# Patient Record
Sex: FEMALE | Race: BLACK OR AFRICAN AMERICAN | Employment: UNEMPLOYED | URBAN - METROPOLITAN AREA
[De-identification: names, ages, dates, MRNs, and addresses within clinical notes are randomized per-mention and may not be internally consistent; named-entity substitution may affect disease eponyms.]

---
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
## 2025-01-31 ENCOUNTER — OFFICE VISIT (OUTPATIENT)
Age: 20
End: 2025-01-31
Payer: MEDICAID

## 2025-01-31 VITALS
TEMPERATURE: 97.8 F | HEART RATE: 79 BPM | RESPIRATION RATE: 15 BRPM | DIASTOLIC BLOOD PRESSURE: 91 MMHG | SYSTOLIC BLOOD PRESSURE: 136 MMHG | HEIGHT: 63 IN | OXYGEN SATURATION: 96 % | WEIGHT: 151 LBS | BODY MASS INDEX: 26.75 KG/M2

## 2025-01-31 DIAGNOSIS — N92.6 MISSED MENSES: Primary | ICD-10-CM

## 2025-01-31 PROCEDURE — 99202 OFFICE O/P NEW SF 15 MIN: CPT

## 2025-01-31 SDOH — ECONOMIC STABILITY: FOOD INSECURITY: WITHIN THE PAST 12 MONTHS, YOU WORRIED THAT YOUR FOOD WOULD RUN OUT BEFORE YOU GOT MONEY TO BUY MORE.: NEVER TRUE

## 2025-01-31 SDOH — ECONOMIC STABILITY: FOOD INSECURITY: WITHIN THE PAST 12 MONTHS, THE FOOD YOU BOUGHT JUST DIDN'T LAST AND YOU DIDN'T HAVE MONEY TO GET MORE.: NEVER TRUE

## 2025-01-31 ASSESSMENT — PATIENT HEALTH QUESTIONNAIRE - PHQ9
2. FEELING DOWN, DEPRESSED OR HOPELESS: NOT AT ALL
SUM OF ALL RESPONSES TO PHQ QUESTIONS 1-9: 0
SUM OF ALL RESPONSES TO PHQ9 QUESTIONS 1 & 2: 0
SUM OF ALL RESPONSES TO PHQ QUESTIONS 1-9: 0
SUM OF ALL RESPONSES TO PHQ QUESTIONS 1-9: 0
1. LITTLE INTEREST OR PLEASURE IN DOING THINGS: NOT AT ALL
SUM OF ALL RESPONSES TO PHQ QUESTIONS 1-9: 0

## 2025-01-31 NOTE — PROGRESS NOTES
Claudia Hope is a 19 y.o. female returns for an annual exam     Chief Complaint   Patient presents with    Annual Exam       Patient's last menstrual period was 09/28/2024 (approximate).  Her periods are irregular.    Problems: Patient states she has not had a menstrual cycle since the \"end of September\".  [facility] and [facility] told her to come to see a gynecologist.  Pregnancy test negative per patient.  Birth Control: none.  Last Pap: n/a  With regard to the Gardisil vaccine, she has not received it yet      1. Have you been to the ER, urgent care clinic, or hospitalized since your last visit?     [facility] due [facility]    2. Have you seen or consulted any other health care providers outside of the [facility] since your last visit? No    Examination chaperoned by Lupe Meza RN.

## 2025-02-01 LAB
ESTRADIOL SERPL-MCNC: 47.5 PG/ML
FSH SERPL-ACNC: 5.1 MIU/ML
HCG INTACT+B SERPL-ACNC: <1 MIU/ML
PROLACTIN SERPL-MCNC: 16.9 NG/ML (ref 4.8–33.4)
TSH SERPL DL<=0.005 MIU/L-ACNC: 1.56 UIU/ML (ref 0.45–4.5)

## 2025-02-01 NOTE — PROGRESS NOTES
Claudia Hope is a 19 y.o. female who complains of amenorrhea since September. Her periods have been irregular in the past but she notes this is an extremely long amount of time to go without having one. She is not having any symptoms. She is not taking any birth control. Of note, she has known chronic htn, managed by other provider.     Her relevant past medical history:   No past medical history on file.     Past Surgical History:   Procedure Laterality Date    TONSILLECTOMY AND ADENOIDECTOMY       Social History     Occupational History    Not on file   Tobacco Use    Smoking status: Never    Smokeless tobacco: Never   Substance and Sexual Activity    Alcohol use: Never    Drug use: Never    Sexual activity: Not on file     No family history on file.    No Known Allergies  Prior to Admission medications    Medication Sig Start Date End Date Taking? Authorizing Provider   ondansetron (ZOFRAN-ODT) 4 MG disintegrating tablet Take 1 tablet by mouth 3 times daily as needed for Nausea or Vomiting  Patient not taking: Reported on 1/31/2025 11/22/23   Mal Rowe MD   famotidine (PEPCID) 20 MG tablet Take 1 tablet by mouth 2 times daily  Patient not taking: Reported on 1/31/2025 11/22/23   Levi Villaseñor MD   ibuprofen (ADVIL;MOTRIN) 600 MG tablet Take 1 tablet by mouth every 6 hours as needed for Pain 5/16/23 5/23/23  Levi Villaseñor MD   fluticasone (FLONASE) 50 MCG/ACT nasal spray 1 spray by Each Nostril route daily  Patient not taking: Reported on 1/31/2025 5/16/23   Levi Villaseñor MD        Review of Systems - History obtained from the patient  Constitutional: negative for weight loss, fever, night sweats  HEENT: negative for hearing loss, earache, congestion, snoring, sorethroat  CV: negative for chest pain, palpitations, edema  Resp: negative for cough, shortness of breath, wheezing  Breast: negative for breast lumps, nipple discharge, galactorrhea  GI: negative for change in bowel habits, abdominal

## 2025-02-04 ENCOUNTER — TELEPHONE (OUTPATIENT)
Age: 20
End: 2025-02-04

## 2025-02-04 NOTE — TELEPHONE ENCOUNTER
Patient returned our call to the office to go over her most recent labs. Message from the provider has been relayed and pt has expressed understanding.     She reports that she does not want to try any birth control as she is currently TTC, which is why she is worried about not having her period.     She wanted to pick your brain to find out if you had any recommendations or tips on conceiving.

## 2025-03-03 ENCOUNTER — TELEPHONE (OUTPATIENT)
Age: 20
End: 2025-03-03

## 2025-03-04 ENCOUNTER — TELEPHONE (OUTPATIENT)
Age: 20
End: 2025-03-04

## 2025-03-10 ENCOUNTER — TELEPHONE (OUTPATIENT)
Age: 20
End: 2025-03-10

## 2025-03-11 ENCOUNTER — OFFICE VISIT (OUTPATIENT)
Age: 20
End: 2025-03-11
Payer: MEDICAID

## 2025-03-11 VITALS
WEIGHT: 161 LBS | HEIGHT: 63 IN | HEART RATE: 91 BPM | DIASTOLIC BLOOD PRESSURE: 84 MMHG | SYSTOLIC BLOOD PRESSURE: 126 MMHG | TEMPERATURE: 98.2 F | OXYGEN SATURATION: 97 % | RESPIRATION RATE: 16 BRPM | BODY MASS INDEX: 28.53 KG/M2

## 2025-03-11 DIAGNOSIS — N92.6 IRREGULAR PERIODS/MENSTRUAL CYCLES: Primary | ICD-10-CM

## 2025-03-11 DIAGNOSIS — N92.6 IRREGULAR PERIODS/MENSTRUAL CYCLES: ICD-10-CM

## 2025-03-11 PROCEDURE — 99213 OFFICE O/P EST LOW 20 MIN: CPT | Performed by: ADVANCED PRACTICE MIDWIFE

## 2025-03-11 NOTE — PROGRESS NOTES
Chief Complaint   Patient presents with    Irregular Menses     PCOS concerns; pt not wanting to be put on birth control        /84   Pulse 91   Temp 98.2 °F (36.8 °C) (Oral)   Resp 16   Ht 1.6 m (5' 3\")   Wt 73 kg (161 lb)   LMP 03/08/2025   SpO2 97%   BMI 28.52 kg/m²      1. Have you been to the ER, urgent care clinic since your last visit?  Hospitalized since your last visit?No    2. Have you seen or consulted any other health care providers outside of the [facility] since your last visit?  Include any pap smears or colon screening. No

## 2025-03-11 NOTE — PROGRESS NOTES
Claudia Hope is a 19 y.o. female who complains of irregular cycles, she started her cycle 3 days ago, prior to this had not had a cycles since October. She saw Suleman Parra the end of Jan 2025 and had some labs done, these were normal however were done while she was amenorrheic. I will order some labs today as she is on day 3 of her cycle.     Claudia as been watching TicTok and researching PCOS on [email or web address]. She is worried she has this, we will do labs so we can discuss the results and any treatment plans.     She is unsure if she wants to conceive at this time. She is fairly sure she does not want to be on birth control.     Her relevant past medical history:   History reviewed. No pertinent past medical history.     Past Surgical History:   Procedure Laterality Date    TONSILLECTOMY AND ADENOIDECTOMY       Social History     Occupational History    Not on file   Tobacco Use    Smoking status: Never    Smokeless tobacco: Never   Substance and Sexual Activity    Alcohol use: Never    Drug use: Never    Sexual activity: Not on file     History reviewed. No pertinent family history.    No Known Allergies  Prior to Admission medications    Medication Sig Start Date End Date Taking? Authorizing Provider   ondansetron (ZOFRAN-ODT) 4 MG disintegrating tablet Take 1 tablet by mouth 3 times daily as needed for Nausea or Vomiting  Patient not taking: Reported on 1/31/2025 11/22/23   Mal Rowe MD   famotidine (PEPCID) 20 MG tablet Take 1 tablet by mouth 2 times daily  Patient not taking: Reported on 1/31/2025 11/22/23   Levi Villaseñor MD   ibuprofen (ADVIL;MOTRIN) 600 MG tablet Take 1 tablet by mouth every 6 hours as needed for Pain 5/16/23 5/23/23  Levi Villaseñor MD   fluticasone (FLONASE) 50 MCG/ACT nasal spray 1 spray by Each Nostril route daily  Patient not taking: Reported on 1/31/2025 5/16/23   Levi Villaseñor MD        Review of Systems - History obtained from the patient  Constitutional: negative for

## 2025-03-12 ENCOUNTER — PATIENT MESSAGE (OUTPATIENT)
Age: 20
End: 2025-03-12

## 2025-03-12 LAB — HBA1C MFR BLD: 5.5 % (ref 4.8–5.6)

## 2025-03-13 LAB
DHEA-S SERPL-MCNC: 322 UG/DL (ref 110–433.2)
ESTRADIOL SERPL-MCNC: 41.3 PG/ML
FSH SERPL-ACNC: 5.2 MIU/ML
LH SERPL-ACNC: 16.5 MIU/ML
PROLACTIN SERPL-MCNC: 14.1 NG/ML (ref 4.8–33.4)

## 2025-03-14 LAB — 17OHP SERPL-MCNC: 106 NG/DL

## 2025-03-16 LAB — MIS SERPL-MCNC: 7.21 NG/ML

## 2025-03-17 ENCOUNTER — TELEPHONE (OUTPATIENT)
Age: 20
End: 2025-03-17

## 2025-03-17 NOTE — TELEPHONE ENCOUNTER
Patient called in, name and  verified. Patient is calling as she had recently had some blood work done that [facility] ordered. She has been able to view them online for a while but has not had any guidance on the results as of yet.    Could we review these and let her know next steps?    She states we were looking into possible PCOS with her abnormal periods. She also reports she is TTC so she is anxious. Please let me know if there are any messages you would like relayed to the pt.    Triage notes: [phone]

## 2025-03-18 ENCOUNTER — RESULTS FOLLOW-UP (OUTPATIENT)
Age: 20
End: 2025-03-18

## 2025-03-18 LAB
TESTOST FREE SERPL-MCNC: 2.6 PG/ML
TESTOST SERPL-MCNC: 63 NG/DL (ref 13–71)

## 2025-03-31 ENCOUNTER — OFFICE VISIT (OUTPATIENT)
Age: 20
End: 2025-03-31
Payer: MEDICAID

## 2025-03-31 VITALS
HEART RATE: 102 BPM | WEIGHT: 166 LBS | HEIGHT: 63 IN | DIASTOLIC BLOOD PRESSURE: 83 MMHG | SYSTOLIC BLOOD PRESSURE: 117 MMHG | RESPIRATION RATE: 16 BRPM | OXYGEN SATURATION: 97 % | TEMPERATURE: 98.6 F | BODY MASS INDEX: 29.41 KG/M2

## 2025-03-31 DIAGNOSIS — Z32.02 PREGNANCY TEST NEGATIVE: Primary | ICD-10-CM

## 2025-03-31 DIAGNOSIS — Z31.69 ENCOUNTER FOR PRECONCEPTION CONSULTATION: ICD-10-CM

## 2025-03-31 LAB
HCG, PREGNANCY, URINE, POC: NEGATIVE
VALID INTERNAL CONTROL, POC: YES

## 2025-03-31 PROCEDURE — 81025 URINE PREGNANCY TEST: CPT

## 2025-03-31 PROCEDURE — 99212 OFFICE O/P EST SF 10 MIN: CPT

## 2025-03-31 NOTE — PROGRESS NOTES
Claudia Hope is a 19 y.o. female who presents to discuss conception planning. Pt has been seen for this previously but is here today for an official visit. Previously had lab work done in our office that indicates increased LH:FSH. She also has irregular cycles. She has been having unprotected sex for 8 months and trying to use an erin to track.     Her relevant past medical history:   History reviewed. No pertinent past medical history.     Past Surgical History:   Procedure Laterality Date    TONSILLECTOMY AND ADENOIDECTOMY       Social History     Occupational History    Not on file   Tobacco Use    Smoking status: Never    Smokeless tobacco: Never   Substance and Sexual Activity    Alcohol use: Never    Drug use: Never    Sexual activity: Not on file     History reviewed. No pertinent family history.    No Known Allergies  Prior to Admission medications    Medication Sig Start Date End Date Taking? Authorizing Provider   ondansetron (ZOFRAN-ODT) 4 MG disintegrating tablet Take 1 tablet by mouth 3 times daily as needed for Nausea or Vomiting  Patient not taking: Reported on 3/31/2025 11/22/23   Mal Rowe MD   famotidine (PEPCID) 20 MG tablet Take 1 tablet by mouth 2 times daily  Patient not taking: Reported on 3/31/2025 11/22/23   Levi Villaseñor MD   ibuprofen (ADVIL;MOTRIN) 600 MG tablet Take 1 tablet by mouth every 6 hours as needed for Pain 5/16/23 5/23/23  Levi Villaseñor MD   fluticasone (FLONASE) 50 MCG/ACT nasal spray 1 spray by Each Nostril route daily  Patient not taking: Reported on 3/31/2025 5/16/23   Levi Villaseñor MD        Review of Systems - History obtained from the patient  Constitutional: negative for weight loss, fever, night sweats  HEENT: negative for hearing loss, earache, congestion, snoring, sorethroat  CV: negative for chest pain, palpitations, edema  Resp: negative for cough, shortness of breath, wheezing  Breast: negative for breast lumps, nipple discharge,

## 2025-03-31 NOTE — PROGRESS NOTES
Chief Complaint   Patient presents with    Results    Follow-up        /83   Pulse (!) 102   Temp 98.6 °F (37 °C) (Oral)   Resp 16   Ht 1.6 m (5' 3\")   Wt 75.3 kg (166 lb)   LMP 03/08/2025   SpO2 97%   BMI 29.41 kg/m²      1. Have you been to the ER, urgent care clinic since your last visit?  Hospitalized since your last visit?No    2. Have you seen or consulted any other health care providers outside of the [facility] since your last visit?  Include any pap smears or colon screening. No

## 2025-04-10 ENCOUNTER — TELEPHONE (OUTPATIENT)
Age: 20
End: 2025-04-10

## 2025-06-10 ENCOUNTER — TELEPHONE (OUTPATIENT)
Age: 20
End: 2025-06-10

## 2025-06-10 ENCOUNTER — TELEMEDICINE (OUTPATIENT)
Age: 20
End: 2025-06-10
Payer: MEDICAID

## 2025-06-10 DIAGNOSIS — E28.2 PCOS (POLYCYSTIC OVARIAN SYNDROME): ICD-10-CM

## 2025-06-10 DIAGNOSIS — Z31.69 ENCOUNTER FOR PRECONCEPTION CONSULTATION: Primary | ICD-10-CM

## 2025-06-10 PROCEDURE — 99203 OFFICE O/P NEW LOW 30 MIN: CPT | Performed by: ADVANCED PRACTICE MIDWIFE

## 2025-06-10 RX ORDER — CYANOCOBALAMIN (VITAMIN B-12) 500 MCG
1 TABLET ORAL DAILY
Qty: 90 CAPSULE | Refills: 3 | Status: SHIPPED | OUTPATIENT
Start: 2025-06-10

## 2025-06-10 NOTE — PROGRESS NOTES
Chief Complaint   Patient presents with    Follow-up       Ty-Malena Hope is a 20 y.o. female is being seen for a problem visit.       Ob/Gyn Hx:    G0 -   No LMP recorded. (Menstrual status: Irregular periods). 5/18/2025  Birth Control - none  Hx of STI  -   Sexually active- yes    Health Maintenance:    Last Pap - n/a  HPV -     1. Have you been to the ER, urgent care clinic, or hospitalized since your last visit? yes    2. Have you seen or consulted any other health care providers outside of the [facility] since your last visit? no    Exam chaperoned by:      Mitra Howe LPN  
Clomid.  Prescription for prenatal vitamins sent to pharmacy  All questions answered     LETY Du CNM

## 2025-06-10 NOTE — TELEPHONE ENCOUNTER
Left message to inform patient that we were needing to change her appt from an office visit, to a VV.     Mychart message sent as well.     Appt cancelled.

## 2025-08-26 ENCOUNTER — TELEPHONE (OUTPATIENT)
Age: 20
End: 2025-08-26